# Patient Record
Sex: MALE | Race: WHITE | Employment: FULL TIME | URBAN - METROPOLITAN AREA
[De-identification: names, ages, dates, MRNs, and addresses within clinical notes are randomized per-mention and may not be internally consistent; named-entity substitution may affect disease eponyms.]

---

## 2020-06-11 ENCOUNTER — APPOINTMENT (OUTPATIENT)
Dept: CT IMAGING | Age: 60
End: 2020-06-11
Payer: COMMERCIAL

## 2020-06-11 ENCOUNTER — APPOINTMENT (OUTPATIENT)
Dept: GENERAL RADIOLOGY | Age: 60
End: 2020-06-11
Payer: COMMERCIAL

## 2020-06-11 ENCOUNTER — HOSPITAL ENCOUNTER (EMERGENCY)
Age: 60
Discharge: HOME | End: 2020-06-11
Attending: EMERGENCY MEDICINE
Payer: COMMERCIAL

## 2020-06-11 VITALS
HEART RATE: 66 BPM | BODY MASS INDEX: 27.85 KG/M2 | TEMPERATURE: 98.3 F | RESPIRATION RATE: 18 BRPM | DIASTOLIC BLOOD PRESSURE: 87 MMHG | HEIGHT: 74 IN | WEIGHT: 217 LBS | SYSTOLIC BLOOD PRESSURE: 134 MMHG | OXYGEN SATURATION: 97 %

## 2020-06-11 PROCEDURE — 99284 EMERGENCY DEPT VISIT MOD MDM: CPT

## 2020-06-11 PROCEDURE — 90715 TDAP VACCINE 7 YRS/> IM: CPT | Performed by: EMERGENCY MEDICINE

## 2020-06-11 PROCEDURE — 90471 IMMUNIZATION ADMIN: CPT | Performed by: EMERGENCY MEDICINE

## 2020-06-11 PROCEDURE — 6370000000 HC RX 637 (ALT 250 FOR IP): Performed by: EMERGENCY MEDICINE

## 2020-06-11 PROCEDURE — 73564 X-RAY EXAM KNEE 4 OR MORE: CPT

## 2020-06-11 PROCEDURE — 12002 RPR S/N/AX/GEN/TRNK2.6-7.5CM: CPT

## 2020-06-11 PROCEDURE — 6360000002 HC RX W HCPCS: Performed by: EMERGENCY MEDICINE

## 2020-06-11 PROCEDURE — 2500000003 HC RX 250 WO HCPCS: Performed by: EMERGENCY MEDICINE

## 2020-06-11 PROCEDURE — 12013 RPR F/E/E/N/L/M 2.6-5.0 CM: CPT

## 2020-06-11 PROCEDURE — 73700 CT LOWER EXTREMITY W/O DYE: CPT

## 2020-06-11 RX ORDER — CEPHALEXIN 250 MG/1
500 CAPSULE ORAL ONCE
Status: COMPLETED | OUTPATIENT
Start: 2020-06-11 | End: 2020-06-11

## 2020-06-11 RX ORDER — IBUPROFEN 800 MG/1
800 TABLET ORAL ONCE
Status: COMPLETED | OUTPATIENT
Start: 2020-06-11 | End: 2020-06-11

## 2020-06-11 RX ORDER — CEPHALEXIN 500 MG/1
500 CAPSULE ORAL 4 TIMES DAILY
Qty: 28 CAPSULE | Refills: 0 | Status: SHIPPED | OUTPATIENT
Start: 2020-06-11 | End: 2020-06-18

## 2020-06-11 RX ORDER — LIDOCAINE HYDROCHLORIDE AND EPINEPHRINE 10; 10 MG/ML; UG/ML
20 INJECTION, SOLUTION INFILTRATION; PERINEURAL ONCE
Status: COMPLETED | OUTPATIENT
Start: 2020-06-11 | End: 2020-06-11

## 2020-06-11 RX ORDER — CYCLOBENZAPRINE HCL 5 MG
5 TABLET ORAL 3 TIMES DAILY PRN
Qty: 20 TABLET | Refills: 0 | Status: SHIPPED | OUTPATIENT
Start: 2020-06-11 | End: 2020-06-21

## 2020-06-11 RX ORDER — HYDROCODONE BITARTRATE AND ACETAMINOPHEN 5; 325 MG/1; MG/1
1 TABLET ORAL EVERY 4 HOURS PRN
Qty: 15 TABLET | Refills: 0 | Status: SHIPPED | OUTPATIENT
Start: 2020-06-11 | End: 2020-06-14

## 2020-06-11 RX ORDER — IBUPROFEN 800 MG/1
800 TABLET ORAL EVERY 8 HOURS PRN
Qty: 30 TABLET | Refills: 0 | Status: SHIPPED | OUTPATIENT
Start: 2020-06-11

## 2020-06-11 RX ADMIN — CEPHALEXIN 500 MG: 250 CAPSULE ORAL at 16:11

## 2020-06-11 RX ADMIN — LIDOCAINE HYDROCHLORIDE,EPINEPHRINE BITARTRATE 20 ML: 10; .01 INJECTION, SOLUTION INFILTRATION; PERINEURAL at 18:05

## 2020-06-11 RX ADMIN — TETANUS TOXOID, REDUCED DIPHTHERIA TOXOID AND ACELLULAR PERTUSSIS VACCINE, ADSORBED 0.5 ML: 5; 2.5; 8; 8; 2.5 SUSPENSION INTRAMUSCULAR at 16:11

## 2020-06-11 RX ADMIN — IBUPROFEN 800 MG: 800 TABLET, FILM COATED ORAL at 16:11

## 2020-06-11 ASSESSMENT — PAIN SCALES - GENERAL
PAINLEVEL_OUTOF10: 1

## 2020-06-11 NOTE — ED NOTES
Pt resting on stretcher. NAD noted. RR even and nonlabored. Call light within reach. Will continue to monitor.        Magali Cabrales RN  06/11/20 3762

## 2020-06-11 NOTE — ED PROVIDER NOTES
insecurity     Worry: Not on file     Inability: Not on file    Transportation needs     Medical: Not on file     Non-medical: Not on file   Tobacco Use    Smoking status: Not on file   Substance and Sexual Activity    Alcohol use: Not on file    Drug use: Not on file    Sexual activity: Not on file   Lifestyle    Physical activity     Days per week: Not on file     Minutes per session: Not on file    Stress: Not on file   Relationships    Social connections     Talks on phone: Not on file     Gets together: Not on file     Attends Yarsani service: Not on file     Active member of club or organization: Not on file     Attends meetings of clubs or organizations: Not on file     Relationship status: Not on file    Intimate partner violence     Fear of current or ex partner: Not on file     Emotionally abused: Not on file     Physically abused: Not on file     Forced sexual activity: Not on file   Other Topics Concern    Not on file   Social History Narrative    Not on file       No family history on file. Allergies:  Bactrim [sulfamethoxazole-trimethoprim]    Home Medications:  Prior to Admission medications    Medication Sig Start Date End Date Taking? Authorizing Provider   cephALEXin (KEFLEX) 500 MG capsule Take 1 capsule by mouth 4 times daily for 7 days 6/11/20 6/18/20 Yes Edison Dow MD   ibuprofen (ADVIL;MOTRIN) 800 MG tablet Take 1 tablet by mouth every 8 hours as needed for Pain 6/11/20  Yes Edison Dow MD   cyclobenzaprine (FLEXERIL) 5 MG tablet Take 1 tablet by mouth 3 times daily as needed for Muscle spasms 6/11/20 6/21/20 Yes Edison Dow MD       REVIEW OF SYSTEMS    (2-9 systems for level 4, 10 or more for level 5)      Review of Systems   Constitutional: Negative for activity change, appetite change and fever. HENT: Negative for congestion and sore throat. Eyes: Negative for pain and visual disturbance. Respiratory: Negative for cough and shortness of breath. Truong Arroyo MD  Emergency Medicine Resident    (Please note that portions of thisnote were completed with a voice recognition program.  Efforts were made to edit the dictations but occasionally words are mis-transcribed.)       Truong Arroyo MD  Resident  06/15/20 3514

## 2020-06-11 NOTE — CONSULTS
Orthopedic Surgery Consult      CC/Reason for consult:  Right patella fracture     HPI:    The patient is a 62 y.o. male that presents to the emergency department following a fall from a ladder. Patient reports that he fell when turning on a ladder and fell about 4ft onto his knees and outstretched arms. He notes he also hit his head during the fall but did not have LOC. He noticed blood on his right knee through his jeans and when he rolled up his pant legs he noticed a laceration to the knee. He was able to ambulate following the fall with minimal pain. Denies any other injuries. Denies any numbness or tingling in the extremity. Past Medical History:    No past medical history on file. Past Surgical History:    No past surgical history on file.     Medications Prior to Admission:   Prior to Admission medications    Not on File       Allergies:    Bactrim [sulfamethoxazole-trimethoprim]    Social History:   Social History     Socioeconomic History    Marital status:      Spouse name: Not on file    Number of children: Not on file    Years of education: Not on file    Highest education level: Not on file   Occupational History    Not on file   Social Needs    Financial resource strain: Not on file    Food insecurity     Worry: Not on file     Inability: Not on file    Transportation needs     Medical: Not on file     Non-medical: Not on file   Tobacco Use    Smoking status: Not on file   Substance and Sexual Activity    Alcohol use: Not on file    Drug use: Not on file    Sexual activity: Not on file   Lifestyle    Physical activity     Days per week: Not on file     Minutes per session: Not on file    Stress: Not on file   Relationships    Social connections     Talks on phone: Not on file     Gets together: Not on file     Attends Druze service: Not on file     Active member of club or organization: Not on file     Attends meetings of clubs or organizations: Not on file PATIENT CALLED ASKING FOR A MEDICATION REFILL ON HER   valACYclovir (VALTREX) 500 MG tablet  losartan (COZAAR) 50 MG tablet  levothyroxine (SYNTHROID, LEVOTHROID) 175 MCG tablet  losartan-hydrochlorothiazide (HYZAAR) 50-12.5 MG per tablet  cyclobenzaprine (FLEXERIL) 10 MG tablet.    SHE WOULD LIKE THAT CALLED IN TO [facility].    THANKS,JHOAN   Relationship status: Not on file    Intimate partner violence     Fear of current or ex partner: Not on file     Emotionally abused: Not on file     Physically abused: Not on file     Forced sexual activity: Not on file   Other Topics Concern    Not on file   Social History Narrative    Not on file       Family History:  No family history on file. REVIEW OF SYSTEMS:   Constitutional: Negative for fever and chills. Respiratory: Negative for cough, shortness of breath and wheezing. Cardiovascular: Negative for chest pain and palpitations. Gastrointestinal: Negative for nausea. Negative for vomiting. Musculoskeletal: Positive for right knee pain    Skin: Negative for itching and rash. Neurological: Negative for dizziness, sensory change and headaches. PHYSICAL EXAM:  Blood pressure 134/87, pulse 66, temperature 98.3 °F (36.8 °C), temperature source Oral, resp. rate 18, height 6' 2\" (1.88 m), weight 217 lb (98.4 kg), SpO2 97 %. Gen: alert and oriented, NAD, cooperative    Head: Laceration to head     Chest: Non labored breathing. b/l clavicles without TTP, crepitus, step off, or deformity    Cardiovascular: Regular rate, no dependent edema, distal pulses 2+    Respiratory: Chest symmetric, no accessory muscle use, normal respirations    Abdomen: non distended    Pelvis: stable to anterior and lateral compression    RUE: No ecchymoses, abrasion, deformity, or lacerations. Skin intact. Non tender to palpation. No pain with ROM of the shoulder, elbow, wrist. Compartments soft. 2+ rad pulse. Median/Radial/Ulnar/AIN/PIN motor intact. Median/Radial/Ulnar nerve SILT. LUE: No ecchymoses, abrasion, deformity, or lacerations. Skin intact. Non tender to palpation. No pain with ROM of the shoulder, elbow, wrist. Compartments soft. 2+ rad pulse. Median/Radial/Ulnar/AIN/PIN motor intact. Median/Radial/Ulnar nerve SILT. RLE: Small 1cm laceration overlying patella with venous oozing.  Minimal TTP to

## 2020-06-11 NOTE — ED PROVIDER NOTES
examined the patient in conjunction with the APC and agree with the treatment plan and disposition of the patient as recorded by the APC.     Dominick Mckinnon MD  Attending Emergency  Physician  beth Rich MD  06/11/20 [phone]

## 2020-06-11 NOTE — ED NOTES
Pt came to ED with c/o head laceration and puncture wound to right knee. Pt stated he fell off a ladder at work today. Pt did hit his head but did not lose consciousness. Pt has about a 3 cm laceration above right eyebrow. Pt has puncture wound to right knee. Pt stated his pain is about 1/10. Pt unsure when his last tetanus shot was. Pt stated injury happened around 1100 today. Pt was seen at clinic at his work today. Pt was sent to ED to have sutures. Pt stated he has already had the drug testing done. Dr. Olga Spence at bedside with Dino Jenkins.      Sintia Barbosa, KARMEN  06/11/20 1600

## 2020-06-15 ASSESSMENT — ENCOUNTER SYMPTOMS
COUGH: 0
ABDOMINAL PAIN: 0
EYE PAIN: 0
DIARRHEA: 0
NAUSEA: 0
SORE THROAT: 0
SHORTNESS OF BREATH: 0

## 2020-06-18 ENCOUNTER — OFFICE VISIT (OUTPATIENT)
Dept: ORTHOPEDIC SURGERY | Age: 60
End: 2020-06-18
Payer: COMMERCIAL

## 2020-06-18 VITALS — BODY MASS INDEX: 27.84 KG/M2 | WEIGHT: 216.93 LBS | HEIGHT: 74 IN

## 2020-06-18 PROCEDURE — 99203 OFFICE O/P NEW LOW 30 MIN: CPT | Performed by: STUDENT IN AN ORGANIZED HEALTH CARE EDUCATION/TRAINING PROGRAM

## 2020-06-18 SDOH — HEALTH STABILITY: MENTAL HEALTH: HOW OFTEN DO YOU HAVE A DRINK CONTAINING ALCOHOL?: NEVER

## 2020-06-18 NOTE — PROGRESS NOTES
concerns. Otherwise follow-up as indicated. Return in about 2 weeks (around 7/2/2020) for re-eval.    No orders of the defined types were placed in this encounter. No orders of the defined types were placed in this encounter.      Electronically signed by Carmen Parisi DO on 6/18/2020 at 5:09 PM

## 2020-07-02 ENCOUNTER — OFFICE VISIT (OUTPATIENT)
Dept: ORTHOPEDIC SURGERY | Age: 60
End: 2020-07-02
Payer: COMMERCIAL

## 2020-07-02 VITALS — BODY MASS INDEX: 27.84 KG/M2 | HEIGHT: 74 IN | WEIGHT: 216.93 LBS

## 2020-07-02 PROCEDURE — 99212 OFFICE O/P EST SF 10 MIN: CPT | Performed by: STUDENT IN AN ORGANIZED HEALTH CARE EDUCATION/TRAINING PROGRAM

## 2020-07-02 NOTE — LETTER
[facility]  [facility] [facility] [address] [facility]  Phone: [phone]  Fax: [phone]    Terence Malik DO        July 2, 2020     Patient: Javon Ewing   YOB: 1960   Date of Visit: 7/2/2020       To Whom it May Concern:    Jovani Zurita was seen in my clinic on 7/2/2020. It is my medical opinion that Jovani Zurita may return to work with the following restrictions: no ladders, no stairs, no ramp . He is okay to resume driving and ambulating on flat surfaces. Patient will be re-evaluated in 3 weeks Dx: Right tripartite patella. If you have any questions or concerns, please don't hesitate to call.     Sincerely,         Terence Malik DO

## 2020-07-02 NOTE — PROGRESS NOTES
[facility]  [facility]  [address] [address]  Dept: [phone]    Ambulatory Orthopedic Consult    CHIEF COMPLAINT:    Chief Complaint   Patient presents with    Knee Injury     Right, DOI- 6/11/20 [facility]       HISTORY OF PRESENT ILLNESS:      Patient presents today continuing to improve. Pain occasionally. Denies any new injuries or falls. Has returned to work with restrictions. No crutches or brace at this time. Prior HPI: The patient is a 62 y.o. male who is being seen for consultation and evaluation of right knee wound/injury. He is a 68-year-old  male that sustained a fall from a ladder on 6/11/2020 at which time he landed onto his right side. He presented to the emergency room at the time of injury with a laceration along the right eye as well as an abrasion and small laceration to the right knee. He was evaluated in the ER at that time including x-rays and subsequent CT with concern for lateral patellar facet fracture versus multipartite patella. Facial wound and right knee wound were irrigated at the bedside and closed primarily with suture at that time and a knee immobilizer was given to the patient with instructions to weight-bear with the immobilizer on. As of presentation in clinic today the patient has been ambulating without the immobilizer and has no significant pain with range of motion of the lung going up and down stairs. Denies any other pain at this time. He denies any numbness or tingling in the affected extremity. He denies any significant past injury or surgery to the right lower extremity. Past Medical History:    No past medical history on file. Past Surgical History:    No past surgical history on file.     Current Medications:   Current Outpatient Medications   Medication Sig Dispense Refill    cephALEXin (KEFLEX) 500 MG capsule Take 1 capsule by mouth 4 times daily for 7 days 28 capsule 0    ibuprofen (ADVIL;MOTRIN) 800 MG tablet Take 1 tablet by mouth every 8 hours as needed for Pain (Patient not taking: Reported on 6/18/2020) 30 tablet 0    cyclobenzaprine (FLEXERIL) 5 MG tablet Take 1 tablet by mouth 3 times daily as needed for Muscle spasms (Patient not taking: Reported on 6/18/2020) 20 tablet 0     No current facility-administered medications for this visit. Allergies:    Bactrim [sulfamethoxazole-trimethoprim]    Social History:   Social History     Socioeconomic History    Marital status:      Spouse name: Not on file    Number of children: Not on file    Years of education: Not on file    Highest education level: Not on file   Occupational History    Not on file   Social Needs    Financial resource strain: Not on file    Food insecurity     Worry: Not on file     Inability: Not on file    Transportation needs     Medical: Not on file     Non-medical: Not on file   Tobacco Use    Smoking status: Never Smoker    Smokeless tobacco: Never Used   Substance and Sexual Activity    Alcohol use: Never     Frequency: Never    Drug use: Not on file    Sexual activity: Not on file   Lifestyle    Physical activity     Days per week: Not on file     Minutes per session: Not on file    Stress: Not on file   Relationships    Social connections     Talks on phone: Not on file     Gets together: Not on file     Attends Uatsdin service: Not on file     Active member of club or organization: Not on file     Attends meetings of clubs or organizations: Not on file     Relationship status: Not on file    Intimate partner violence     Fear of current or ex partner: Not on file     Emotionally abused: Not on file     Physically abused: Not on file     Forced sexual activity: Not on file   Other Topics Concern    Not on file   Social History Narrative    Not on file       Family History:  No family history on file. REVIEW OF SYSTEMS:  Constitutional: Negative for fever and chills.    HENT: Negative for patellar facet fracture versus multipartite patella. Additionally noted is a small flabella along the posterior lateral joint line. No findings of any additional subluxation/dislocation or other osseous lesions. Previous comparison films: June 11, 2020    Impression:  Right multipartite patella    ASSESSMENT:     1. Bipartite patella, patella fracture   2. Abrasion of right knee, subsequent encounter       PLAN:     -Patient continues to improve. Does have slight tenderness to lateral patella with no new injuries. History suggest fracture vs multipartite, management at this time remains the same. Patient instructed to be careful at this time.   -Patient may resume weightbearing as tolerated without any immobilization or brace use. Work letter provided to the patient today to resume driving activities as well as walking at work as long as on a level flat surface. Patient should continue to refrain from stairs, ramps, ladders, or inclines for an additional 2 weeks.  -Plan for follow-up in 3 weeks time to reevaluate wound and resolution of swelling. At that time patient will likely be released back to full activities without any restrictions at work. -Patient expressed understanding of and agreement to this treatment plan. He knows to call the office in the interim with any questions or concerns. Otherwise follow-up as indicated.     ----------------------------------------  Keith Edmonds DO  PGY-3, Department of [address], [place], [place]

## 2020-07-23 ENCOUNTER — OFFICE VISIT (OUTPATIENT)
Dept: ORTHOPEDIC SURGERY | Age: 60
End: 2020-07-23
Payer: COMMERCIAL

## 2020-07-23 VITALS — WEIGHT: 216 LBS | BODY MASS INDEX: 27.72 KG/M2 | HEIGHT: 74 IN

## 2020-07-23 PROCEDURE — 99213 OFFICE O/P EST LOW 20 MIN: CPT | Performed by: STUDENT IN AN ORGANIZED HEALTH CARE EDUCATION/TRAINING PROGRAM

## 2020-07-23 NOTE — LETTER
[facility]  [address] [address] [facility]  Phone: [phone]  Fax: [phone]    Abisai Kimbrough MD        July 23, 2020     Patient: Max Harris   YOB: 1960   Date of Visit: 7/23/2020       To Whom It May Concern: It is my medical opinion that Yoan Patiño may return to full duty immediately with no restrictions. If you have any questions or concerns, please don't hesitate to call. Sincerely,        Teresa Lemon.  Homero Kimbrough MD

## 2020-07-23 NOTE — LETTER
[facility]  [address] [address] [facility]  Phone: [phone]  Fax: [phone]    Jacqueline Matthews MD        July 23, 2020     Patient: Tere Bernabe   YOB: 1960   Date of Visit: 7/23/2020       To Whom It May Concern: It is my medical opinion that Dafne Zaidi may return to full duty immediately with no restrictions. If you have any questions or concerns, please don't hesitate to call. Sincerely,        Nery Krause.  Jone Matthews MD

## 2020-07-23 NOTE — PROGRESS NOTES
[facility]  [facility]  [address] [facility] [address]  Dept: [phone]    Ambulatory Orthopedic Office Visit      CHIEF COMPLAINT:    Chief Complaint   Patient presents with    Knee Pain     right [facility]     HISTORY OF PRESENT ILLNESS:    The patient is a 56 y.o. male who is being seen for follow-up of right knee pain secondary to multipartite patella vs lateral patellar facet fracture sustained in a fall on 6/11/2020. Last visit to this office was 7/02/2020, at that time he was instructed to resume driving and walking/working on flat surfaces, but to refrain from using stairs, ladders, ramps or inclines for 2 weeks. Today he feels much better, states he has been able to walk up stairs with minimal pain. He is able to walk on flat surfaces without pain. He does have significant swelling of the knee today that he attributes to walking all day yesterday. Patient is here today because he would like to be released to full activities without restrictions at work. Prior history:  The patient sustained a right knee injury & wound after a fall from a ladder on 6/11/2020. In the ED X-rays and CT were concerning for lateral patellar facet fracture vs multipartite patella. Right knee wound was irrigated and closed at bedside, and patient was given knee immobilizer. Since then the patient has been seen in the ortho office twice, he had been ambulating without the immobilizer without significant pain. At the last visit he had slight tenderness to lateral patella, he was instructed to resume weightbearing, and he resumed driving activities and returned to work. Patient was instructed to refrain from using stairs, ramps, ladders, or inclines for 2 weeks. Past Medical History:    No past medical history on file. Past Surgical History:    No past surgical history on file.   Current Medications:   Current Outpatient Medications   Medication Sig Dispense Refill    ibuprofen (ADVIL;MOTRIN) 800 MG tablet Take 1 tablet by mouth every 8 hours as needed for Pain (Patient not taking: Reported on 6/18/2020) 30 tablet 0     No current facility-administered medications for this visit. Allergies:    Bactrim [sulfamethoxazole-trimethoprim]  Social History:   Social History     Socioeconomic History    Marital status:      Spouse name: Not on file    Number of children: Not on file    Years of education: Not on file    Highest education level: Not on file   Occupational History    Not on file   Social Needs    Financial resource strain: Not on file    Food insecurity     Worry: Not on file     Inability: Not on file    Transportation needs     Medical: Not on file     Non-medical: Not on file   Tobacco Use    Smoking status: Never Smoker    Smokeless tobacco: Never Used   Substance and Sexual Activity    Alcohol use: Never     Frequency: Never    Drug use: Not on file    Sexual activity: Not on file   Lifestyle    Physical activity     Days per week: Not on file     Minutes per session: Not on file    Stress: Not on file   Relationships    Social connections     Talks on phone: Not on file     Gets together: Not on file     Attends Adventism service: Not on file     Active member of club or organization: Not on file     Attends meetings of clubs or organizations: Not on file     Relationship status: Not on file    Intimate partner violence     Fear of current or ex partner: Not on file     Emotionally abused: Not on file     Physically abused: Not on file     Forced sexual activity: Not on file   Other Topics Concern    Not on file   Social History Narrative    Not on file     Family History:  No family history on file. REVIEW OF SYSTEMS:  Constitutional: Negative for fever and chills. HENT: Negative forcongestion or drainage   Eyes: Negative for blurred vision and double vision. Respiratory: Negative for cough, shortness of breath and wheezing. Cardiovascular: Negative for chest pain and palpitations. Gastrointestinal: Negative for nausea. Negative for vomiting. Musculoskeletal: Positive for myalgias and joint pain. Skin: Negative for itching and rash. Neurological: Negative for dizziness, sensory change and headaches. Psychiatric/Behavioral: Negative for depression andsuicidal ideas. PHYSICAL EXAM:  There were no vitals taken for this visit. General: Jessica Lopez is a 56 y.o. male who is alert and oriented and sitting comfortably in our office. Neuro: Alert and oriented. Eyes: Extra-ocular muscles intact. Mouth: Oral mucosa moist. No perioral lesions. Pulm: Respirations unlabored and regular. Skin: Warm, well perfused. Psych: Patient has good fund of knowledge and displays understanging of exam, diagnosis, and plan. Ortho Exam:  MSK-RLE:  Superficial abrasion and scabbing with no drainage or erythema of the right knee. Moderate prepatellar effusion, no erythema, calor or induration. No tenderness to palpation of lateral patellar facet, no crepitus, active range of motion 0-140 degrees with no significant pain. Leg is warm and perfused, sensation is intact bilaterally. RADIOLOGY:   History:  Right knee wound and swelling, fall 6/11/2020    Comparison:  June 11, June 18    Findings:  AP/Lateral/Notch/Merchant views of the right knee  revisualization of curvilinear lucency along the lateral patellar facet most notable on merchant view, demonstrating demonstrating lateral patellar facet fracture versus bipartite patella, there is significant prepatellar soft tissue swelling demonstrated on XR today. Impression:  Multipartite patella of superior patella    Assessment/Plan:   Jessica Lopez is a 56y.o. year old male with right patellar lateral facet fracture vs bipartite patella s/p fall on 6/11/2020    1. Bipartite patella/lateral patellar fracture on right  2.  Abrasion of knee, subsequent encounter    Plan:  - Physical exam and x-ray findings were reviewed with the patient in office today. Given the clinical findings of no significant crepitus, ecchymosis or tenderness with palpation along the lateral patella, with full weightbearing and range of motion intact without significant pain, and patient reports being able to ambulate up and down stairs and inclines without pain, patient is ready to return to work without restriction. He has a flexible work schedule and is able to work in office if the need arises. He was given a clearance letter to return to work without restriction, and will return on a PRN basis if the pain or weakness returns.  ----------------------------------------  Jacqueline Matthews MD  PGY-2, [address] [place] [address]